# Patient Record
Sex: MALE | Race: WHITE | NOT HISPANIC OR LATINO | URBAN - METROPOLITAN AREA
[De-identification: names, ages, dates, MRNs, and addresses within clinical notes are randomized per-mention and may not be internally consistent; named-entity substitution may affect disease eponyms.]

---

## 2021-02-04 VITALS
SYSTOLIC BLOOD PRESSURE: 159 MMHG | TEMPERATURE: 98 F | DIASTOLIC BLOOD PRESSURE: 73 MMHG | OXYGEN SATURATION: 98 % | HEART RATE: 56 BPM | RESPIRATION RATE: 16 BRPM

## 2021-02-04 RX ORDER — CHLORHEXIDINE GLUCONATE 213 G/1000ML
1 SOLUTION TOPICAL ONCE
Refills: 0 | Status: DISCONTINUED | OUTPATIENT
Start: 2021-02-08 | End: 2021-02-09

## 2021-02-04 NOTE — H&P ADULT - NSICDXPASTMEDICALHX_GEN_ALL_CORE_FT
PAST MEDICAL HISTORY:  Coronary artery disease     ED (erectile dysfunction)     Hyperlipidemia     Hypertension

## 2021-02-04 NOTE — H&P ADULT - NS MD HP PULSE RADIAL
+ 2 b/l, normal Karan's Test Rt Radial, prior access no sign of infection, redness, Rt hand warm well perfused

## 2021-02-04 NOTE — H&P ADULT - HISTORY OF PRESENT ILLNESS
Cardiologist: Dr. Fran GRAY:  Pharmacy:  Escort:     75 y/o M with FHx of CAD, PMH of HTN, HLD, CAD s/p PCI with most recent  cardiac cath @ [facility] 1/27/21 revealing 2 V CAD ( LCX: 90% ostial LCx, calcified, patent stent in mid LCx into OM, RCA: 80% distal stenosis, 90% RPDA, heavily calcified ), mild dLM disease, patent stent in p/m LAD with mild ISR, EF 60 % who now presents to [facility] for staged complex  PCI of known residual disease.     Since his procedure; Pt continues to endorse...   CP and SOB X ..   dull, worse with exertion    Pt denies any ... .. dizziness, palpitations, N/V, LE edema, orthopnea/PND, fever/chills, loss of taste, recent travel/sick contacts.  In light of patients risk factors, CCS class __ sx and known residual disease; pt now presents for staged PCI of knwon LCx/RCA disease,    COVID PCR at [facility] on 02/05  Cardiologist: Dr. Peters  Pharmacy: [facility], [address] [place]     Medications: Confirmed over the phone w/ patient     77 y/o male w/ strong FHx CAD (mother and father both passed away from MI) and PMHx HTN, HLD, CAD (s/p prior PCI's. most recent in 2014 at [place]) and erectile dysfunction who initially presented to his outpatient cardiologist, Dr. Peters, c/o worsening chest tightness when walking uphill. Patient reports he also noticed the chest tightness more so when he walked after he had just eaten. Patient subsequently underwent cardiac catheterization at [facility] on 01/27/2021revealing 2V CAD w/ LCX 90%, ostial LCx calcified, patent stent in mid LCx into OM, RCA 80% distal stenosis, 90% RPDA, heavily calcified, mild distal LM disease, patent stent in proximal/mid LAD w/ mild ISR, and EF 60%. Since procedure, patient states he has not noticed worsening symptoms and states he feels his symptoms may have mildly improved, but does endorse still experiencing some chest tightness. Patient denies any SOB/MORILLO, dizziness, lightheadedness, syncope, abdominal pain, nausea/vomiting, LE edema, melena, recent fever/chills/cough, recent travel, or known sick contacts.     In light of patient's risk factors and known residual CAD from recent cardiac catheterization, patient now presents to [facility] for staged PCI.  COVID PCR at [facility] on 02/05 NEGATIVE in [place]  Cardiologist: Dr. Peters  Pharmacy: [facility], [address] [place]     Medications: Confirmed over the phone w/ patient     77 y/o male w/ strong FHx CAD (mother and father both passed away from MI) and PMHx HTN, HLD, CAD (s/p prior PCI's. most recent in 2014 at [place]) and erectile dysfunction who initially presented to his outpatient cardiologist, Dr. Peters, c/o worsening chest tightness when walking uphill. Patient reports he also noticed the chest tightness more so when he walked after he had just eaten. Patient subsequently underwent cardiac catheterization at [facility] on 01/27/2021revealing 2V CAD w/ LCX 90%, ostial LCx calcified, patent stent in mid LCx into OM, RCA 80% distal stenosis, 90% RPDA, heavily calcified, mild distal LM disease, patent stent in proximal/mid LAD w/ mild ISR, and EF 60%. Since procedure, patient states he has not noticed worsening symptoms and states he feels his symptoms may have mildly improved, but does endorse still experiencing some chest tightness. Patient denies any SOB/MORILLO, dizziness, lightheadedness, syncope, abdominal pain, nausea/vomiting, LE edema, melena, recent fever/chills/cough, recent travel, or known sick contacts.     In light of patient's risk factors, Class 2 angina symptoms, and known CAD progression from recent cardiac catheterization, patient now presents to [facility] for staged PCI.

## 2021-02-04 NOTE — H&P ADULT - NSHPSOCIALHISTORY_GEN_ALL_CORE
Patient endorses drink wine w/ dinner 2-3 times per week. Patient denies any tobacco use or illicit drug use.

## 2021-02-08 ENCOUNTER — INPATIENT (INPATIENT)
Facility: HOSPITAL | Age: 76
LOS: 0 days | Discharge: HOME | DRG: 247 | End: 2021-02-09
Attending: INTERNAL MEDICINE | Admitting: INTERNAL MEDICINE
Payer: MEDICARE

## 2021-02-08 LAB
A1C WITH ESTIMATED AVERAGE GLUCOSE RESULT: 5.2 % — SIGNIFICANT CHANGE UP (ref 4–5.6)
ALBUMIN SERPL ELPH-MCNC: 4.1 G/DL — SIGNIFICANT CHANGE UP (ref 3.3–5)
ALP SERPL-CCNC: 57 U/L — SIGNIFICANT CHANGE UP (ref 40–120)
ALT FLD-CCNC: 18 U/L — SIGNIFICANT CHANGE UP (ref 10–45)
ANION GAP SERPL CALC-SCNC: 10 MMOL/L — SIGNIFICANT CHANGE UP (ref 5–17)
APTT BLD: 28.7 SEC — SIGNIFICANT CHANGE UP (ref 27.5–35.5)
AST SERPL-CCNC: 18 U/L — SIGNIFICANT CHANGE UP (ref 10–40)
BASOPHILS # BLD AUTO: 0.02 K/UL — SIGNIFICANT CHANGE UP (ref 0–0.2)
BASOPHILS NFR BLD AUTO: 0.3 % — SIGNIFICANT CHANGE UP (ref 0–2)
BILIRUB DIRECT SERPL-MCNC: <0.2 MG/DL — SIGNIFICANT CHANGE UP (ref 0–0.2)
BILIRUB INDIRECT FLD-MCNC: SIGNIFICANT CHANGE UP (ref 0.2–1)
BILIRUB SERPL-MCNC: 0.8 MG/DL — SIGNIFICANT CHANGE UP (ref 0.2–1.2)
BUN SERPL-MCNC: 19 MG/DL — SIGNIFICANT CHANGE UP (ref 7–23)
CALCIUM SERPL-MCNC: 9.1 MG/DL — SIGNIFICANT CHANGE UP (ref 8.4–10.5)
CHLORIDE SERPL-SCNC: 108 MMOL/L — SIGNIFICANT CHANGE UP (ref 96–108)
CHOLEST SERPL-MCNC: 157 MG/DL — SIGNIFICANT CHANGE UP
CO2 SERPL-SCNC: 23 MMOL/L — SIGNIFICANT CHANGE UP (ref 22–31)
CREAT SERPL-MCNC: 1.14 MG/DL — SIGNIFICANT CHANGE UP (ref 0.5–1.3)
CRP SERPL-MCNC: 0.11 MG/DL — SIGNIFICANT CHANGE UP (ref 0–0.4)
EOSINOPHIL # BLD AUTO: 0.08 K/UL — SIGNIFICANT CHANGE UP (ref 0–0.5)
EOSINOPHIL NFR BLD AUTO: 1.2 % — SIGNIFICANT CHANGE UP (ref 0–6)
ESTIMATED AVERAGE GLUCOSE: 103 MG/DL — SIGNIFICANT CHANGE UP (ref 68–114)
GLUCOSE SERPL-MCNC: 106 MG/DL — HIGH (ref 70–99)
HCT VFR BLD CALC: 44.4 % — SIGNIFICANT CHANGE UP (ref 39–50)
HDLC SERPL-MCNC: 47 MG/DL — SIGNIFICANT CHANGE UP
HGB BLD-MCNC: 14.9 G/DL — SIGNIFICANT CHANGE UP (ref 13–17)
IMM GRANULOCYTES NFR BLD AUTO: 0.2 % — SIGNIFICANT CHANGE UP (ref 0–1.5)
INR BLD: 1.06 — SIGNIFICANT CHANGE UP (ref 0.88–1.16)
LIPID PNL WITH DIRECT LDL SERPL: 76 MG/DL — SIGNIFICANT CHANGE UP
LYMPHOCYTES # BLD AUTO: 1.33 K/UL — SIGNIFICANT CHANGE UP (ref 1–3.3)
LYMPHOCYTES # BLD AUTO: 20.6 % — SIGNIFICANT CHANGE UP (ref 13–44)
MCHC RBC-ENTMCNC: 29.6 PG — SIGNIFICANT CHANGE UP (ref 27–34)
MCHC RBC-ENTMCNC: 33.6 GM/DL — SIGNIFICANT CHANGE UP (ref 32–36)
MCV RBC AUTO: 88.1 FL — SIGNIFICANT CHANGE UP (ref 80–100)
MONOCYTES # BLD AUTO: 0.5 K/UL — SIGNIFICANT CHANGE UP (ref 0–0.9)
MONOCYTES NFR BLD AUTO: 7.8 % — SIGNIFICANT CHANGE UP (ref 2–14)
NEUTROPHILS # BLD AUTO: 4.51 K/UL — SIGNIFICANT CHANGE UP (ref 1.8–7.4)
NEUTROPHILS NFR BLD AUTO: 69.9 % — SIGNIFICANT CHANGE UP (ref 43–77)
NON HDL CHOLESTEROL: 110 MG/DL — SIGNIFICANT CHANGE UP
NRBC # BLD: 0 /100 WBCS — SIGNIFICANT CHANGE UP (ref 0–0)
PLATELET # BLD AUTO: 168 K/UL — SIGNIFICANT CHANGE UP (ref 150–400)
POTASSIUM SERPL-MCNC: 4.7 MMOL/L — SIGNIFICANT CHANGE UP (ref 3.5–5.3)
POTASSIUM SERPL-SCNC: 4.7 MMOL/L — SIGNIFICANT CHANGE UP (ref 3.5–5.3)
PROT SERPL-MCNC: 6.9 G/DL — SIGNIFICANT CHANGE UP (ref 6–8.3)
PROTHROM AB SERPL-ACNC: 12.7 SEC — SIGNIFICANT CHANGE UP (ref 10.6–13.6)
RBC # BLD: 5.04 M/UL — SIGNIFICANT CHANGE UP (ref 4.2–5.8)
RBC # FLD: 13 % — SIGNIFICANT CHANGE UP (ref 10.3–14.5)
SODIUM SERPL-SCNC: 141 MMOL/L — SIGNIFICANT CHANGE UP (ref 135–145)
TRIGL SERPL-MCNC: 169 MG/DL — HIGH
WBC # BLD: 6.45 K/UL — SIGNIFICANT CHANGE UP (ref 3.8–10.5)
WBC # FLD AUTO: 6.45 K/UL — SIGNIFICANT CHANGE UP (ref 3.8–10.5)

## 2021-02-08 PROCEDURE — 93454 CORONARY ARTERY ANGIO S&I: CPT | Mod: 26,59

## 2021-02-08 PROCEDURE — 92978 ENDOLUMINL IVUS OCT C 1ST: CPT | Mod: 26,LC

## 2021-02-08 PROCEDURE — 92928 PRQ TCAT PLMT NTRAC ST 1 LES: CPT | Mod: LD

## 2021-02-08 PROCEDURE — 93010 ELECTROCARDIOGRAM REPORT: CPT

## 2021-02-08 PROCEDURE — 92933 PRQ TRLML C ATHRC ST ANGIOP1: CPT | Mod: LC

## 2021-02-08 PROCEDURE — 92921: CPT | Mod: LC

## 2021-02-08 RX ORDER — UBIDECARENONE 100 MG
1 CAPSULE ORAL
Qty: 0 | Refills: 0 | DISCHARGE

## 2021-02-08 RX ORDER — TICAGRELOR 90 MG/1
90 TABLET ORAL
Refills: 0 | Status: DISCONTINUED | OUTPATIENT
Start: 2021-02-09 | End: 2021-02-09

## 2021-02-08 RX ORDER — ASPIRIN/CALCIUM CARB/MAGNESIUM 324 MG
81 TABLET ORAL DAILY
Refills: 0 | Status: DISCONTINUED | OUTPATIENT
Start: 2021-02-09 | End: 2021-02-09

## 2021-02-08 RX ORDER — ASPIRIN/CALCIUM CARB/MAGNESIUM 324 MG
1 TABLET ORAL
Qty: 0 | Refills: 0 | DISCHARGE

## 2021-02-08 RX ORDER — TICAGRELOR 90 MG/1
180 TABLET ORAL ONCE
Refills: 0 | Status: COMPLETED | OUTPATIENT
Start: 2021-02-08 | End: 2021-02-08

## 2021-02-08 RX ORDER — SODIUM CHLORIDE 9 MG/ML
500 INJECTION INTRAMUSCULAR; INTRAVENOUS; SUBCUTANEOUS
Refills: 0 | Status: DISCONTINUED | OUTPATIENT
Start: 2021-02-08 | End: 2021-02-09

## 2021-02-08 RX ORDER — LISINOPRIL 2.5 MG/1
40 TABLET ORAL DAILY
Refills: 0 | Status: DISCONTINUED | OUTPATIENT
Start: 2021-02-09 | End: 2021-02-09

## 2021-02-08 RX ORDER — SODIUM CHLORIDE 9 MG/ML
500 INJECTION INTRAMUSCULAR; INTRAVENOUS; SUBCUTANEOUS
Refills: 0 | Status: DISCONTINUED | OUTPATIENT
Start: 2021-02-08 | End: 2021-02-08

## 2021-02-08 RX ORDER — METOPROLOL TARTRATE 50 MG
1.5 TABLET ORAL
Qty: 0 | Refills: 0 | DISCHARGE

## 2021-02-08 RX ORDER — SODIUM CHLORIDE 9 MG/ML
250 INJECTION INTRAMUSCULAR; INTRAVENOUS; SUBCUTANEOUS ONCE
Refills: 0 | Status: COMPLETED | OUTPATIENT
Start: 2021-02-08 | End: 2021-02-08

## 2021-02-08 RX ORDER — RAMIPRIL 5 MG
1 CAPSULE ORAL
Qty: 0 | Refills: 0 | DISCHARGE

## 2021-02-08 RX ORDER — ROSUVASTATIN CALCIUM 5 MG/1
1 TABLET ORAL
Qty: 0 | Refills: 0 | DISCHARGE

## 2021-02-08 RX ORDER — METOPROLOL TARTRATE 50 MG
75 TABLET ORAL DAILY
Refills: 0 | Status: DISCONTINUED | OUTPATIENT
Start: 2021-02-08 | End: 2021-02-09

## 2021-02-08 RX ORDER — ATORVASTATIN CALCIUM 80 MG/1
40 TABLET, FILM COATED ORAL AT BEDTIME
Refills: 0 | Status: DISCONTINUED | OUTPATIENT
Start: 2021-02-08 | End: 2021-02-09

## 2021-02-08 RX ADMIN — TICAGRELOR 180 MILLIGRAM(S): 90 TABLET ORAL at 16:41

## 2021-02-08 RX ADMIN — SODIUM CHLORIDE 250 MILLILITER(S): 9 INJECTION INTRAMUSCULAR; INTRAVENOUS; SUBCUTANEOUS at 12:30

## 2021-02-08 RX ADMIN — SODIUM CHLORIDE 100 MILLILITER(S): 9 INJECTION INTRAMUSCULAR; INTRAVENOUS; SUBCUTANEOUS at 13:22

## 2021-02-08 NOTE — CHART NOTE - NSCHARTNOTEFT_GEN_A_CORE
Interventional Cardiology PA Sheath Pull Note    Pt without complaints. VSS      Pre-Sheath Removal:    [ X] Right     [X ] Groin    [ ] Brachial    [X ] 5Fr       [X ] Venous sheath in place    [no ] Hematoma        [no ] Bleed    Pulses:    [X ] Right     [ ] Left       DP:  [ ]  Doppler   [ ]  Faint    [ ]   1+    [X ] 2+       Hemostasis Achieved with:    14             minutes manual pressure    Vasovagal Reaction: No    Meds Given:  none      Post-Sheath Removal:    [X ] Right     [X ] Groin    [no ] Hematoma        [no ] Bleed       [no ] Bruit    Pulses:    [X ] Right     DP:  [ ]  Doppler   [ ]  Faint    [ ]   1+    [X ] 2+     A/P:  s/p [ X] PTCA/STENT    -Continue bedrest (pt given instructions)  -Continue to monitor

## 2021-02-09 ENCOUNTER — TRANSCRIPTION ENCOUNTER (OUTPATIENT)
Age: 76
End: 2021-02-09

## 2021-02-09 VITALS — HEART RATE: 74 BPM | SYSTOLIC BLOOD PRESSURE: 159 MMHG | DIASTOLIC BLOOD PRESSURE: 72 MMHG | RESPIRATION RATE: 19 BRPM

## 2021-02-09 LAB
ANION GAP SERPL CALC-SCNC: 8 MMOL/L — SIGNIFICANT CHANGE UP (ref 5–17)
BUN SERPL-MCNC: 18 MG/DL — SIGNIFICANT CHANGE UP (ref 7–23)
CALCIUM SERPL-MCNC: 8.7 MG/DL — SIGNIFICANT CHANGE UP (ref 8.4–10.5)
CHLORIDE SERPL-SCNC: 106 MMOL/L — SIGNIFICANT CHANGE UP (ref 96–108)
CO2 SERPL-SCNC: 24 MMOL/L — SIGNIFICANT CHANGE UP (ref 22–31)
CREAT SERPL-MCNC: 1.11 MG/DL — SIGNIFICANT CHANGE UP (ref 0.5–1.3)
GLUCOSE SERPL-MCNC: 107 MG/DL — HIGH (ref 70–99)
HCT VFR BLD CALC: 39.1 % — SIGNIFICANT CHANGE UP (ref 39–50)
HCV AB S/CO SERPL IA: 0.08 S/CO — SIGNIFICANT CHANGE UP
HCV AB SERPL-IMP: SIGNIFICANT CHANGE UP
HGB BLD-MCNC: 12.9 G/DL — LOW (ref 13–17)
MAGNESIUM SERPL-MCNC: 1.9 MG/DL — SIGNIFICANT CHANGE UP (ref 1.6–2.6)
MCHC RBC-ENTMCNC: 28.9 PG — SIGNIFICANT CHANGE UP (ref 27–34)
MCHC RBC-ENTMCNC: 33 GM/DL — SIGNIFICANT CHANGE UP (ref 32–36)
MCV RBC AUTO: 87.5 FL — SIGNIFICANT CHANGE UP (ref 80–100)
NRBC # BLD: 0 /100 WBCS — SIGNIFICANT CHANGE UP (ref 0–0)
PLATELET # BLD AUTO: 143 K/UL — LOW (ref 150–400)
POTASSIUM SERPL-MCNC: 4.2 MMOL/L — SIGNIFICANT CHANGE UP (ref 3.5–5.3)
POTASSIUM SERPL-SCNC: 4.2 MMOL/L — SIGNIFICANT CHANGE UP (ref 3.5–5.3)
RBC # BLD: 4.47 M/UL — SIGNIFICANT CHANGE UP (ref 4.2–5.8)
RBC # FLD: 13.2 % — SIGNIFICANT CHANGE UP (ref 10.3–14.5)
SODIUM SERPL-SCNC: 138 MMOL/L — SIGNIFICANT CHANGE UP (ref 135–145)
WBC # BLD: 7.83 K/UL — SIGNIFICANT CHANGE UP (ref 3.8–10.5)
WBC # FLD AUTO: 7.83 K/UL — SIGNIFICANT CHANGE UP (ref 3.8–10.5)

## 2021-02-09 PROCEDURE — 99239 HOSP IP/OBS DSCHRG MGMT >30: CPT

## 2021-02-09 RX ORDER — CLOPIDOGREL BISULFATE 75 MG/1
1 TABLET, FILM COATED ORAL
Qty: 0 | Refills: 0 | DISCHARGE

## 2021-02-09 RX ORDER — TICAGRELOR 90 MG/1
1 TABLET ORAL
Qty: 60 | Refills: 3
Start: 2021-02-09 | End: 2021-06-08

## 2021-02-09 RX ORDER — MAGNESIUM OXIDE 400 MG ORAL TABLET 241.3 MG
400 TABLET ORAL ONCE
Refills: 0 | Status: COMPLETED | OUTPATIENT
Start: 2021-02-09 | End: 2021-02-09

## 2021-02-09 RX ADMIN — Medication 75 MILLIGRAM(S): at 05:27

## 2021-02-09 RX ADMIN — TICAGRELOR 90 MILLIGRAM(S): 90 TABLET ORAL at 05:27

## 2021-02-09 RX ADMIN — MAGNESIUM OXIDE 400 MG ORAL TABLET 400 MILLIGRAM(S): 241.3 TABLET ORAL at 09:20

## 2021-02-09 RX ADMIN — Medication 81 MILLIGRAM(S): at 12:18

## 2021-02-09 NOTE — DISCHARGE NOTE PROVIDER - HOSPITAL COURSE
77 y/o male w/ strong FHx CAD (mother and father both passed away from MI) and PMHx HTN, HLD, CAD (s/p prior PCI's. most recent in 2014 at [place]) and erectile dysfunction who initially presented to his outpatient cardiologist, Dr. Peters, c/o worsening chest tightness when walking uphill. Patient reports he also noticed the chest tightness more so when he walked after he had just eaten. Patient subsequently underwent cardiac catheterization at [facility] on 01/27/2021revealing 2V CAD w/ LCX 90%, ostial LCx calcified, patent stent in mid LCx into OM, RCA 80% distal stenosis, 90% RPDA, heavily calcified, mild distal LM disease, patent stent in proximal/mid LAD w/ mild ISR, and EF 60%. Since procedure, patient states he has not noticed worsening symptoms and states he feels his symptoms may have mildly improved, but does endorse still experiencing some chest tightness. Patient denies any SOB/MORILLO, dizziness, lightheadedness, syncope, abdominal pain, nausea/vomiting, LE edema, melena, recent fever/chills/cough, recent travel, or known sick contacts.     In light of patient's risk factors, Class 2 angina symptoms, and known CAD progression from recent cardiac catheterization, patient now presents to [facility] for staged PCI.     Patient now s/p cath 2/8/21  Roto/arthectomy/HARLEY X 1 m/d RCA, laser Lcx, HARLEY pCx, HARLEY oLAD to LM, PTCA Om1, TVP removed; R groin PC, R groin 5 F Venous sheath; EF 60 % by recent cath. Venous sheath removed without complications. Of note, patient takes ASA/Plavix at home and was loaded with  Brilinta 180 mg PO x 1 post procedure and pt to be d/c on ASA/Brilinta.     Pt seen and examined at bedside this morning. Pt comfortable, denies any CP, SOB, dizziness, or palpitations. VSS, R groin access site stable, no bleeding or hematoma noted, pulse 2 + b/l. AM labs stable. Patient has been given appropriate discharge instructions including medication regimen, access site management and follow up. Prescriptions have been e-prescribed to patient's preferred pharmacy. Follow up with Dr. Peters within 1 - 2 weeks of discharge from the hospital.     DC Meds:    77 y/o male w/ strong FHx CAD (mother and father both passed away from MI) and PMHx HTN, HLD, CAD (s/p prior PCI's. most recent in 2014 at [place]) and erectile dysfunction who initially presented to his outpatient cardiologist, Dr. Peters, c/o worsening chest tightness when walking uphill. Patient reports he also noticed the chest tightness more so when he walked after he had just eaten. Patient subsequently underwent cardiac catheterization at [facility] on 01/27/2021revealing 2V CAD w/ LCX 90%, ostial LCx calcified, patent stent in mid LCx into OM, RCA 80% distal stenosis, 90% RPDA, heavily calcified, mild distal LM disease, patent stent in proximal/mid LAD w/ mild ISR, and EF 60%. Since procedure, patient states he has not noticed worsening symptoms and states he feels his symptoms may have mildly improved, but does endorse still experiencing some chest tightness. Patient denies any SOB/MORILLO, dizziness, lightheadedness, syncope, abdominal pain, nausea/vomiting, LE edema, melena, recent fever/chills/cough, recent travel, or known sick contacts.     In light of patient's risk factors, Class 2 angina symptoms, and known CAD progression from recent cardiac catheterization, patient now presents to [facility] for staged PCI.     Patient now s/p cath 2/8/21  Roto/arthectomy/HARLEY X 1 m/d RCA, laser Lcx, HARLEY pCx, HARLEY oLAD to LM, PTCA Om1, TVP removed; R groin PC, R groin 5 F Venous sheath; EF 60 % by recent cath. Venous sheath removed without complications. Of note, patient takes ASA/Plavix at home and was loaded with  Brilinta 180 mg PO x 1 post procedure and pt to be d/c on ASA/Brilinta.     Pt seen and examined at bedside this morning. Pt comfortable, denies any CP, SOB, dizziness, or palpitations. VSS, R groin access site stable, no bleeding or hematoma noted, pulse 2 + b/l. AM labs stable. Patient has been given appropriate discharge instructions including medication regimen, access site management and follow up. Prescriptions have been e-prescribed to patient's preferred pharmacy. Follow up with Dr. Peters within 1 - 2 weeks of discharge from the hospital.     DC Meds:      77 y/o male w/ strong FHx CAD (mother and father both passed away from MI) and PMHx HTN, HLD, CAD (s/p prior PCI's. most recent in 2014 at [place]) and erectile dysfunction who initially presented to his outpatient cardiologist, Dr. Peters, c/o worsening chest tightness when walking uphill. Patient reports he also noticed the chest tightness more so when he walked after he had just eaten. Patient subsequently underwent cardiac catheterization at [facility] on 01/27/2021revealing 2V CAD w/ LCX 90%, ostial LCx calcified, patent stent in mid LCx into OM, RCA 80% distal stenosis, 90% RPDA, heavily calcified, mild distal LM disease, patent stent in proximal/mid LAD w/ mild ISR, and EF 60%. Since procedure, patient states he has not noticed worsening symptoms and states he feels his symptoms may have mildly improved, but does endorse still experiencing some chest tightness. Patient denies any SOB/MORILLO, dizziness, lightheadedness, syncope, abdominal pain, nausea/vomiting, LE edema, melena, recent fever/chills/cough, recent travel, or known sick contacts.     In light of patient's risk factors, Class 2 angina symptoms, and known CAD progression from recent cardiac catheterization, patient now presents to [facility] for staged PCI.     Patient now s/p cath 2/8/21  Roto/arthectomy/HARLEY X 1 m/d RCA, laser Lcx, HARLEY pCx, HARLEY oLAD to LM, PTCA Om1, TVP removed; R groin PC, R groin 5 F Venous sheath; EF 60 % by recent cath. Venous sheath removed without complications. Of note, patient takes ASA/Plavix at home and was loaded with  Brilinta 180 mg PO x 1 post procedure and pt to be d/c on ASA/Brilinta.     Pt seen and examined at bedside this morning. Pt comfortable, denies any CP, SOB, dizziness, or palpitations. VSS, R groin access site stable, no bleeding or hematoma noted, pulse 2 + b/l. AM labs stable. Patient has been given appropriate discharge instructions including medication regimen, access site management and follow up. Prescriptions have been e-prescribed to patient's preferred pharmacy. Follow up with Dr. Peters within 1 - 2 weeks of discharge from the hospital.     DC Meds:     ASA 81mg  Ramipril 10mg  Rosuvastatin 10mg   Brilinta 90mg BID  Toprol XL 75mg

## 2021-02-09 NOTE — DISCHARGE NOTE PROVIDER - NSDCFUADDINST_GEN_ALL_CORE_FT
- You are to STOP Plavix and CONTINUE Brilinta 90mg twice a day.  - NEVER MISS A DOSE OF ASPIRIN OR BRILINTA. IF YOU DO, YOU ARE AT RISK OF YOUR STENTS CLOSING AND HAVING A HEART ATTACK. DO NOT STOP THESE TWO MEDICATIONS UNLESS INSTRUCTED TO DO SO BY YOUR CARDIOLOGIST.  - Do NOT drive or operate hazardous machinery for 24 hours. Limit your physical activity for 24-48 hours. Do NOT engage in sports, heavy work or heavy lifting for 72 hours.  - You MAY shower BUT no TUB BATHS, HOT TUBS OR SWIMMING FOR 5 DAYS - Your procedure was done through your right groin. If you observe flank bleeding from the puncture site, it is an emergency. Please put direct pressure on the site and go directly to the ER. Bleeding under the skin may also occur and a small "black and blue" may be expected. If the area appears to be expanding or swelling around the puncture site, apply manual compression and go immediately to the nearest ER. If your foot/leg becomes cool or blue and/or you are unable to move it, this must be treated as an emergency, go directly to the nearest ER. Look for signs of infection in the groin: fever, red streaking of the leg, obvious pus formation and pain.

## 2021-02-09 NOTE — DISCHARGE NOTE NURSING/CASE MANAGEMENT/SOCIAL WORK - PATIENT PORTAL LINK FT
You can access the FollowMyHealth Patient Portal offered by [facility] by registering at the following website: [email or web address]. By joining [employer]’s FollowMyHealth portal, you will also be able to view your health information using other applications (apps) compatible with our system.

## 2021-02-09 NOTE — DISCHARGE NOTE PROVIDER - CARE PROVIDER_API CALL
Noman Peters (MD)  Cardiovascular Disease  [address] [place] [address]  Phone: [phone]  Fax: [phone]  Follow Up Time: 2 weeks

## 2021-02-09 NOTE — DISCHARGE NOTE PROVIDER - NSDCCPCAREPLAN_GEN_ALL_CORE_FT
PRINCIPAL DISCHARGE DIAGNOSIS  Diagnosis: Chest pain  Assessment and Plan of Treatment: - You have a known history of coronary artery disease. You presented to the hospital for a planned cardiac catheterization for which 3 STENTS placed.   - You are to STOP Plavix and CONTINUE Brilinta 90mg twice a day.   - NEVER MISS A DOSE OF ASPIRIN OR BRILINTA. IF YOU DO, YOU ARE AT RISK OF YOUR STENTS CLOSING AND HAVING A HEART ATTACK. DO NOT STOP THESE TWO MEDICATIONS UNLESS INSTRUCTED TO DO SO BY YOUR CARDIOLOGIST.   - Do NOT drive or operate hazardous machinery for 24 hours. Limit your physical activity for 24-48 hours. Do NOT engage in sports, heavy work or heavy lifting for 72 hours.   - You MAY shower BUT no TUB BATHS, HOT TUBS OR SWIMMING FOR 5 DAYS  - Your procedure was done through your right groin. If you observe flank bleeding from the puncture site, it is an emergency. Please put direct pressure on the site and go directly to the ER. Bleeding under the skin may also occur and a small "black and blue" may be expected. If the area appears to be expanding or swelling around the puncture site, apply manual compression and go immediately to the nearest ER. If your foot/leg becomes cool or blue and/or you are unable to move it, this must be treated as an emergency, go directly to the nearest ER. Look for signs of infection in the groin: fever, red streaking of the leg, obvious pus formation and pain.       PRINCIPAL DISCHARGE DIAGNOSIS  Diagnosis: Chest pain  Assessment and Plan of Treatment: - You have a known history of coronary artery disease. You presented to the hospital for a planned cardiac catheterization for which 3 STENTS placed.   - You are to STOP Plavix and CONTINUE Brilinta 90mg twice a day.   - NEVER MISS A DOSE OF ASPIRIN OR BRILINTA. IF YOU DO, YOU ARE AT RISK OF YOUR STENTS CLOSING AND HAVING A HEART ATTACK. DO NOT STOP THESE TWO MEDICATIONS UNLESS INSTRUCTED TO DO SO BY YOUR CARDIOLOGIST.   - Do NOT drive or operate hazardous machinery for 24 hours. Limit your physical activity for 24-48 hours. Do NOT engage in sports, heavy work or heavy lifting for 72 hours.   - You MAY shower BUT no TUB BATHS, HOT TUBS OR SWIMMING FOR 5 DAYS  - Your procedure was done through your right groin. If you observe flank bleeding from the puncture site, it is an emergency. Please put direct pressure on the site and go directly to the ER. Bleeding under the skin may also occur and a small "black and blue" may be expected. If the area appears to be expanding or swelling around the puncture site, apply manual compression and go immediately to the nearest ER. If your foot/leg becomes cool or blue and/or you are unable to move it, this must be treated as an emergency, go directly to the nearest ER. Look for signs of infection in the groin: fever, red streaking of the leg, obvious pus formation and pain.      SECONDARY DISCHARGE DIAGNOSES  Diagnosis: Hypertension  Assessment and Plan of Treatment:   - You have a known history of hypertension. Hypertension, commonly called high blood pressure, is when the force of blood pumping through your arteries is too strong. Hypertension forces your heart to work harder to pump blood. Your arteries may become narrow or stiff. Having untreated or uncontrolled hypertension for a long period of time can cause heart attack, stroke, kidney disease, and other problems.   - Please continue your home medications as prescribed without missing doses as they do not work if they are not taken consistently: . Be sure to follow a low salt diet.        PRINCIPAL DISCHARGE DIAGNOSIS  Diagnosis: Chest pain  Assessment and Plan of Treatment: - You have a known history of coronary artery disease. You presented to the hospital for a planned cardiac catheterization for which 3 STENTS placed.   - You are to STOP Plavix and CONTINUE Brilinta 90mg twice a day.   - NEVER MISS A DOSE OF ASPIRIN OR BRILINTA. IF YOU DO, YOU ARE AT RISK OF YOUR STENTS CLOSING AND HAVING A HEART ATTACK. DO NOT STOP THESE TWO MEDICATIONS UNLESS INSTRUCTED TO DO SO BY YOUR CARDIOLOGIST.   - Do NOT drive or operate hazardous machinery for 24 hours. Limit your physical activity for 24-48 hours. Do NOT engage in sports, heavy work or heavy lifting for 72 hours.   - You MAY shower BUT no TUB BATHS, HOT TUBS OR SWIMMING FOR 5 DAYS  - Your procedure was done through your right groin. If you observe flank bleeding from the puncture site, it is an emergency. Please put direct pressure on the site and go directly to the ER. Bleeding under the skin may also occur and a small "black and blue" may be expected. If the area appears to be expanding or swelling around the puncture site, apply manual compression and go immediately to the nearest ER. If your foot/leg becomes cool or blue and/or you are unable to move it, this must be treated as an emergency, go directly to the nearest ER. Look for signs of infection in the groin: fever, red streaking of the leg, obvious pus formation and pain.      SECONDARY DISCHARGE DIAGNOSES  Diagnosis: Hypertension  Assessment and Plan of Treatment: - You have a known history of hypertension. Hypertension, commonly called high blood pressure, is when the force of blood pumping through your arteries is too strong. Hypertension forces your heart to work harder to pump blood. Your arteries may become narrow or stiff. Having untreated or uncontrolled hypertension for a long period of time can cause heart attack, stroke, kidney disease, and other problems.   - Please continue your home medications as prescribed without missing doses as they do not work if they are not taken consistently: Ramipril 10mg and Toprol XL 75mg. Be sure to follow a low salt diet.

## 2021-02-09 NOTE — CHART NOTE - NSCHARTNOTEFT_GEN_A_CORE
Indication for Catheterization: ACS > 24 hours. Staged PCI  Indication for Admission: Performed >/= 2 vessel system PCI; Bifurcation Lesion    Coronary Angiogram:    Three Vessel Coronary Artery Disease  Syntax score intermediate  Frailty score 4    Coronary Angiogram  LMCA: 30-50% stenosis dLM  LAD: 80% instent restenosis of oLAD stent  D1: Mild luminal irregularities  LCx: 90% instent restenosis of pCIRC stent  OM1: 80% stenosis of pOM1  RCA: Large, dominant vessel with 90-95% stenosis of the mRCA    Left Heart Catheterization deferred    Intervention  - Successful Rotational Atherectomy (1.5 mm [place]) and PCI of 90-95% stenosis of mRCA. 0% residual stenosis and excellent angiographic result with JESSY 3 flow post intervention.  - Successful Laser Atherectomy (0.9 mm) and PCI of 90% ISR of pLCx. 0% residual stenosis and excellent angiographic result with JESSY 3 flow post intervention.  - Successful PCI of 80% ISR of oLAD. 0% residual stenosis and excellent angiographic result with JESSY 3 flow post intervention.  - Successful PTCA of 90% stenosis of OM1. 0% residual stenosis and excellent angiographic result with JESSY 3 flow post intervention.  - Kissing Balloon Inflation of oLAD into LM stent and pCIRC stent      Right Femoral Access site closed with a Perclose device with adequate hemostasis prior to leaving the cath lab  No procedural complications      Recommendations:  Load with Brillinta 180mg today  Dual anti-platelet therapy with Aspirin 81mg daily and Brillinta 90 mg BID for at least 1 year after which Aspirin 81mg daily should be continued indefinitely  Optimal medical therapy as tolerated, including appropriate intensity statin, beta blocker and ACE/ARB if indicated  Risk factor modification and risk reduction strategies with lifestyle changes  F/U with Dr Peters upon discharge

## 2021-02-09 NOTE — DISCHARGE NOTE PROVIDER - CARE PROVIDERS DIRECT ADDRESSES
[email or web address]

## 2021-02-09 NOTE — DISCHARGE NOTE PROVIDER - NSDCMRMEDTOKEN_GEN_ALL_CORE_FT
Aspirin Enteric Coated 81 mg oral delayed release tablet: 1 tab(s) orally once a day  Co-Q10 100 mg oral capsule: 1 cap(s) orally 2 times a day  ramipril 10 mg oral capsule: 1 cap(s) orally once a day  rosuvastatin 10 mg oral tablet: 1 tab(s) orally once a day  ticagrelor 90 mg oral tablet: 1 tab(s) orally 2 times a day  Toprol-XL 50 mg oral tablet, extended release: 1.5 tab(s) orally once a day  Viagra 50 mg oral tablet: 1 tab(s) orally once a day

## 2021-02-18 DIAGNOSIS — Z82.49 FAMILY HISTORY OF ISCHEMIC HEART DISEASE AND OTHER DISEASES OF THE CIRCULATORY SYSTEM: ICD-10-CM

## 2021-02-18 DIAGNOSIS — I25.10 ATHEROSCLEROTIC HEART DISEASE OF NATIVE CORONARY ARTERY WITHOUT ANGINA PECTORIS: ICD-10-CM

## 2021-02-18 DIAGNOSIS — I45.10 UNSPECIFIED RIGHT BUNDLE-BRANCH BLOCK: ICD-10-CM

## 2021-02-18 DIAGNOSIS — E78.5 HYPERLIPIDEMIA, UNSPECIFIED: ICD-10-CM

## 2021-02-18 DIAGNOSIS — Z88.1 ALLERGY STATUS TO OTHER ANTIBIOTIC AGENTS STATUS: ICD-10-CM

## 2021-02-18 DIAGNOSIS — T82.855A STENOSIS OF CORONARY ARTERY STENT, INITIAL ENCOUNTER: ICD-10-CM

## 2021-02-18 DIAGNOSIS — N52.9 MALE ERECTILE DYSFUNCTION, UNSPECIFIED: ICD-10-CM

## 2021-02-18 DIAGNOSIS — Z79.02 LONG TERM (CURRENT) USE OF ANTITHROMBOTICS/ANTIPLATELETS: ICD-10-CM

## 2021-02-18 DIAGNOSIS — I25.84 CORONARY ATHEROSCLEROSIS DUE TO CALCIFIED CORONARY LESION: ICD-10-CM
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
## 2021-02-18 DIAGNOSIS — I10 ESSENTIAL (PRIMARY) HYPERTENSION: ICD-10-CM

## 2021-02-18 DIAGNOSIS — Z79.82 LONG TERM (CURRENT) USE OF ASPIRIN: ICD-10-CM

## 2021-02-18 DIAGNOSIS — Z95.5 PRESENCE OF CORONARY ANGIOPLASTY IMPLANT AND GRAFT: ICD-10-CM

## 2021-02-22 PROCEDURE — 80061 LIPID PANEL: CPT

## 2021-02-22 PROCEDURE — C1885: CPT

## 2021-02-22 PROCEDURE — 86140 C-REACTIVE PROTEIN: CPT

## 2021-02-22 PROCEDURE — C1753: CPT

## 2021-02-22 PROCEDURE — C1889: CPT

## 2021-02-22 PROCEDURE — 36415 COLL VENOUS BLD VENIPUNCTURE: CPT

## 2021-02-22 PROCEDURE — 80048 BASIC METABOLIC PNL TOTAL CA: CPT

## 2021-02-22 PROCEDURE — 83735 ASSAY OF MAGNESIUM: CPT

## 2021-02-22 PROCEDURE — 82248 BILIRUBIN DIRECT: CPT

## 2021-02-22 PROCEDURE — 86803 HEPATITIS C AB TEST: CPT

## 2021-02-22 PROCEDURE — 83036 HEMOGLOBIN GLYCOSYLATED A1C: CPT

## 2021-02-22 PROCEDURE — 85730 THROMBOPLASTIN TIME PARTIAL: CPT

## 2021-02-22 PROCEDURE — 85025 COMPLETE CBC W/AUTO DIFF WBC: CPT

## 2021-02-22 PROCEDURE — C1725: CPT

## 2021-02-22 PROCEDURE — C1894: CPT

## 2021-02-22 PROCEDURE — C1724: CPT

## 2021-02-22 PROCEDURE — C1769: CPT

## 2021-02-22 PROCEDURE — 85027 COMPLETE CBC AUTOMATED: CPT

## 2021-02-22 PROCEDURE — C1874: CPT

## 2021-02-22 PROCEDURE — C1887: CPT

## 2021-02-22 PROCEDURE — 93005 ELECTROCARDIOGRAM TRACING: CPT

## 2021-02-22 PROCEDURE — C1760: CPT

## 2021-02-22 PROCEDURE — 85610 PROTHROMBIN TIME: CPT

## 2021-02-22 PROCEDURE — 80053 COMPREHEN METABOLIC PANEL: CPT

## 2023-12-27 NOTE — DISCHARGE NOTE PROVIDER - NSDCQMCOGNITION_NEU_ALL_CORE
SW called patient's spouse at [phone].  Spouse hasn't had a chance to call PCP to schedule apt.  States things have been busy but will try to call today or tomorrow to set up apt.  Patient aware needs to request an appointment to get face to face visit, have PCP view wound for home care referral for skilled nursing and home PT.  Patient unable to get an appointment with neurology for lengthy period of time again to address wound.  SW remains available.    No difficulties

## 2024-12-30 ENCOUNTER — TRANSCRIPTION ENCOUNTER (OUTPATIENT)
Age: 79
End: 2024-12-30

## 2025-05-29 ENCOUNTER — RESULT REVIEW (OUTPATIENT)
Age: 80
End: 2025-05-29